# Patient Record
Sex: MALE | Race: WHITE | Employment: FULL TIME | URBAN - METROPOLITAN AREA
[De-identification: names, ages, dates, MRNs, and addresses within clinical notes are randomized per-mention and may not be internally consistent; named-entity substitution may affect disease eponyms.]

---

## 2017-02-09 ENCOUNTER — OFFICE VISIT (OUTPATIENT)
Dept: WOUND CARE | Facility: HOSPITAL | Age: 27
End: 2017-02-09
Attending: NURSE PRACTITIONER
Payer: COMMERCIAL

## 2017-02-09 DIAGNOSIS — S81.802A LEG WOUND, LEFT, INITIAL ENCOUNTER: Primary | ICD-10-CM

## 2017-02-09 PROCEDURE — 99213 OFFICE O/P EST LOW 20 MIN: CPT | Performed by: NURSE PRACTITIONER

## 2017-02-09 PROCEDURE — 99212 OFFICE O/P EST SF 10 MIN: CPT | Performed by: NURSE PRACTITIONER

## 2017-02-09 PROCEDURE — 99202 OFFICE O/P NEW SF 15 MIN: CPT | Performed by: NURSE PRACTITIONER

## 2017-02-15 NOTE — PROGRESS NOTES
Subjective    Chief Complaint  This information was obtained from the patient  The patient is new to the [address] [facility] here for an initial visit for the evaluation and management of non-healing wound(s)    Allergies  Zithromax (Reaction: hives)    HPI  This suicidal thoughts (hospitalized in May)    Complaints and Symptoms  This information was obtained from the patient  Patient complains of:   Cardiovascular (Central/Peripheral):  Lower extremity (leg) resting pain (left leg pain due to wound, tender to touch dextroamphetamine-amphetamine ER 30 mg 24hr capsule,extend release oral capsule,extended release 24hr oral once daily  quetiapine 100 mg tablet oral tablet oral once nightly  cephalexin 500 mg capsule oral capsule oral  hydrocodone 5 mg-acetaminophen 325 m Neurological:  sensation grossly intact. Psychiatric:  Appropirate judgement and insight. oriented to time, place and person. appropriate mood and affect.     Assessment    Active Problems    ICD-10  (Encounter Diagnosis) B94.577H - Unspecified open wou Educated patient on nutrition for wound healing and the need for increased protein intake. Reviewed the signs and symptoms of infection, instructed patient to call with any sings of infections.   Plan    Wound Orders:  Wound #1 Posterior Lower Leg     Foll

## 2017-02-23 ENCOUNTER — APPOINTMENT (OUTPATIENT)
Dept: WOUND CARE | Facility: HOSPITAL | Age: 27
End: 2017-02-23
Attending: NURSE PRACTITIONER
Payer: COMMERCIAL

## 2017-02-27 ENCOUNTER — APPOINTMENT (OUTPATIENT)
Dept: WOUND CARE | Facility: HOSPITAL | Age: 27
End: 2017-02-27
Payer: COMMERCIAL

## 2017-03-02 ENCOUNTER — APPOINTMENT (OUTPATIENT)
Dept: WOUND CARE | Facility: HOSPITAL | Age: 27
End: 2017-03-02
Attending: NURSE PRACTITIONER
Payer: COMMERCIAL

## (undated) NOTE — MR AVS SNAPSHOT
[address] [place] [address]  [phone]               Thank you for choosing us for your health care visit with JUSTUS Warner.   We are glad to serve you and happy to provide you wi information, go to [email or web address] and click on the Sign Up Now link in the [employer] box. Enter your [employer] Activation Code exactly as it appears below along with your Zip Code and Date of Birth to complete the sign-up process.  If you do